# Patient Record
Sex: FEMALE | Race: WHITE | NOT HISPANIC OR LATINO | Employment: FULL TIME | URBAN - METROPOLITAN AREA
[De-identification: names, ages, dates, MRNs, and addresses within clinical notes are randomized per-mention and may not be internally consistent; named-entity substitution may affect disease eponyms.]

---

## 2017-09-16 ENCOUNTER — HOSPITAL ENCOUNTER (OUTPATIENT)
Facility: MEDICAL CENTER | Age: 62
End: 2017-09-16
Payer: COMMERCIAL

## 2017-09-16 LAB
25(OH)D3 SERPL-MCNC: 35 NG/ML (ref 30–100)
ALBUMIN SERPL BCP-MCNC: 4.2 G/DL (ref 3.2–4.9)
ALBUMIN/GLOB SERPL: 1.7 G/DL
ALP SERPL-CCNC: 63 U/L (ref 30–99)
ALT SERPL-CCNC: 12 U/L (ref 2–50)
ANION GAP SERPL CALC-SCNC: 7 MMOL/L (ref 0–11.9)
AST SERPL-CCNC: 17 U/L (ref 12–45)
BDY FAT % MEASURED: 34.4 %
BILIRUB SERPL-MCNC: 0.5 MG/DL (ref 0.1–1.5)
BP DIAS: 70 MMHG
BP SYS: 110 MMHG
BUN SERPL-MCNC: 22 MG/DL (ref 8–22)
CALCIUM SERPL-MCNC: 9.1 MG/DL (ref 8.5–10.5)
CHLORIDE SERPL-SCNC: 106 MMOL/L (ref 96–112)
CHOLEST SERPL-MCNC: 145 MG/DL (ref 100–199)
CO2 SERPL-SCNC: 27 MMOL/L (ref 20–33)
CREAT SERPL-MCNC: 0.82 MG/DL (ref 0.5–1.4)
DIABETES HTDIA: NO
ERYTHROCYTE [DISTWIDTH] IN BLOOD BY AUTOMATED COUNT: 43 FL (ref 35.9–50)
EVENT NAME HTEVT: NORMAL
GFR SERPL CREATININE-BSD FRML MDRD: >60 ML/MIN/1.73 M 2
GLOBULIN SER CALC-MCNC: 2.5 G/DL (ref 1.9–3.5)
GLUCOSE SERPL-MCNC: 88 MG/DL (ref 65–99)
HCT VFR BLD AUTO: 40.6 % (ref 37–47)
HDLC SERPL-MCNC: 63 MG/DL
HGB BLD-MCNC: 13.3 G/DL (ref 12–16)
HYPERTENSION HTHYP: NO
LDLC SERPL CALC-MCNC: 71 MG/DL
MCH RBC QN AUTO: 29.4 PG (ref 27–33)
MCHC RBC AUTO-ENTMCNC: 32.8 G/DL (ref 33.6–35)
MCV RBC AUTO: 89.8 FL (ref 81.4–97.8)
PLATELET # BLD AUTO: 207 K/UL (ref 164–446)
PMV BLD AUTO: 11.3 FL (ref 9–12.9)
POTASSIUM SERPL-SCNC: 4.1 MMOL/L (ref 3.6–5.5)
PROT SERPL-MCNC: 6.7 G/DL (ref 6–8.2)
RBC # BLD AUTO: 4.52 M/UL (ref 4.2–5.4)
SCREENING LOC CITY HTCIT: NORMAL
SCREENING LOC STATE HTSTA: NORMAL
SCREENING LOCATION HTLOC: NORMAL
SODIUM SERPL-SCNC: 140 MMOL/L (ref 135–145)
SUBSCRIBER ID HTSID: NORMAL
TRIGL SERPL-MCNC: 56 MG/DL (ref 0–149)
WBC # BLD AUTO: 5.1 K/UL (ref 4.8–10.8)

## 2023-11-22 ENCOUNTER — APPOINTMENT (RX ONLY)
Dept: URBAN - METROPOLITAN AREA CLINIC 4 | Facility: CLINIC | Age: 68
Setting detail: DERMATOLOGY
End: 2023-11-22

## 2023-11-22 DIAGNOSIS — D22 MELANOCYTIC NEVI: ICD-10-CM

## 2023-11-22 DIAGNOSIS — D18.0 HEMANGIOMA: ICD-10-CM

## 2023-11-22 DIAGNOSIS — L81.4 OTHER MELANIN HYPERPIGMENTATION: ICD-10-CM

## 2023-11-22 DIAGNOSIS — L82.1 OTHER SEBORRHEIC KERATOSIS: ICD-10-CM

## 2023-11-22 DIAGNOSIS — Z71.89 OTHER SPECIFIED COUNSELING: ICD-10-CM

## 2023-11-22 PROBLEM — D22.5 MELANOCYTIC NEVI OF TRUNK: Status: ACTIVE | Noted: 2023-11-22

## 2023-11-22 PROBLEM — D48.5 NEOPLASM OF UNCERTAIN BEHAVIOR OF SKIN: Status: ACTIVE | Noted: 2023-11-22

## 2023-11-22 PROBLEM — D18.01 HEMANGIOMA OF SKIN AND SUBCUTANEOUS TISSUE: Status: ACTIVE | Noted: 2023-11-22

## 2023-11-22 PROCEDURE — 99203 OFFICE O/P NEW LOW 30 MIN: CPT | Mod: 25

## 2023-11-22 PROCEDURE — ? BIOPSY BY SHAVE METHOD AND DESTRUCTION

## 2023-11-22 PROCEDURE — ? SUNSCREEN RECOMMENDATIONS

## 2023-11-22 PROCEDURE — 11102 TANGNTL BX SKIN SINGLE LES: CPT

## 2023-11-22 PROCEDURE — ? COUNSELING

## 2023-11-22 ASSESSMENT — LOCATION DETAILED DESCRIPTION DERM
LOCATION DETAILED: LEFT PROXIMAL CALF
LOCATION DETAILED: RIGHT POSTERIOR SHOULDER
LOCATION DETAILED: EPIGASTRIC SKIN
LOCATION DETAILED: INFERIOR THORACIC SPINE
LOCATION DETAILED: SUPERIOR LUMBAR SPINE
LOCATION DETAILED: LEFT POSTERIOR SHOULDER

## 2023-11-22 ASSESSMENT — LOCATION SIMPLE DESCRIPTION DERM
LOCATION SIMPLE: LEFT CALF
LOCATION SIMPLE: ABDOMEN
LOCATION SIMPLE: RIGHT SHOULDER
LOCATION SIMPLE: LOWER BACK
LOCATION SIMPLE: UPPER BACK
LOCATION SIMPLE: LEFT SHOULDER

## 2023-11-22 ASSESSMENT — LOCATION ZONE DERM
LOCATION ZONE: LEG
LOCATION ZONE: TRUNK
LOCATION ZONE: ARM

## 2023-11-22 NOTE — PROCEDURE: BIOPSY BY SHAVE METHOD AND DESTRUCTION
